# Patient Record
Sex: MALE | Race: WHITE | NOT HISPANIC OR LATINO | URBAN - METROPOLITAN AREA
[De-identification: names, ages, dates, MRNs, and addresses within clinical notes are randomized per-mention and may not be internally consistent; named-entity substitution may affect disease eponyms.]

---

## 2017-01-30 ENCOUNTER — GENERIC CONVERSION - ENCOUNTER (OUTPATIENT)
Dept: OTHER | Facility: OTHER | Age: 22
End: 2017-01-30

## 2017-01-31 ENCOUNTER — GENERIC CONVERSION - ENCOUNTER (OUTPATIENT)
Dept: OTHER | Facility: OTHER | Age: 22
End: 2017-01-31

## 2018-01-09 NOTE — MISCELLANEOUS
Message      Recorded as Task   Date: 04/09/2016 04:47 PM, Created By: Italia Mora   Task Name: Call Back   Assigned To: TRISTAN LEAL   Regarding Patient: Kurtis Brink, Status: Active   Comment:    Italia Mora - 09 Apr 2016 4:47 PM     TASK CREATED  Please make pt aware that I will no longer be filling his Adderall  His urine drug screens from 3/1 and 3/18 were negative for amphetamines  This is a concern since he has called and requested and  scripts for Adderall every month  Since his urine shows that he is not taking it I am not comfortable prescribing  It should be noted that pt is in recovery from heroin  He is presently getting Suboxone from [facility] in [facility]  This is where the urine drug screens were done  Records from [facility] will be scanned to the chart  He should not receive any controlled substances from this office  Nisha Almazan - 10 Apr 2016 7:56 PM     TASK EDITED  Patient is aware, was not happy  He said he does take it daily  He does not know why it doesn't show up in his urine  He did call back to tell me that he was sick for 2 weeks with vomiting and maybe that's why it didn't show  I told him that we will not fill his Adderall anymore  Active Problems    1  Acute bilateral low back pain without sciatica (724 2,338 19) (M54 5)   2  ADD (attention deficit disorder) without hyperactivity (314 00) (F90 0)   3  Anxiety (300 00) (F41 9)   4  Asthma (493 90) (J45 909)   5  Depression with anxiety (300 4) (F41 8)   6  Drug addiction in remission (304 93) (F19 21)   7  Hepatitis C virus carrier state (V02 62) (Z22 52)   8  Insomnia (780 52) (G47 00)   9  Need for 23-polyvalent pneumococcal polysaccharide vaccine (V03 82) (Z23)   10  Pharyngitis (462) (J02 9)    Current Meds   1  Amphetamine-Dextroamphetamine 20 MG Oral Tablet; Take 2 tablets in the am and 1   tablet at noon; Therapy: 54OLC4036 to (Evaluate:07Apr2016);  Last DB:92LFJ2122 Ordered   2  Buprenorphine HCl - 8 MG Sublingual Tablet Sublingual;   Therapy: (Recorded:22Mar2016) to Recorded   3  Cyclobenzaprine HCl - 7 5 MG Oral Tablet; TAKE 1 TABLET 3 TIMES DAILY AS NEEDED   FOR MUSCLE SPASM; Therapy: 56OIA3506 to (Evaluate:07Apr2016)  Requested for: 28Mar2016; Last   Rx:28Mar2016 Ordered   4  Gabapentin 100 MG Oral Capsule; TAKE 1 CAPSULE Every 8 hours; Therapy: 24QIG5464 to Recorded   5  Naproxen 500 MG Oral Tablet; 1 tab PO q 12 hrs prn pain; Therapy: 68FEF3484 to (Brice Weaver)  Requested for: 28Mar2016; Last   Rx:28Mar2016 Ordered   6  ProAir  (90 Base) MCG/ACT Inhalation Aerosol Solution; USE 2 INHALATIONS   ORALLY   EVERY 4 TO 6 HOURS AS        NEEDED; Therapy: 13YLA5266 to (Evaluate:08Oct2014)  Requested for: 11Apr2014; Last   Rx:11Apr2014 Ordered    Allergies    1  BuSpar   2  LaMICtal TABS   3  Strattera CAPS    Signatures   Electronically signed by : Alejandro Amor;  Apr 11 2016  7:32AM EST                       (Author)

## 2018-01-11 NOTE — MISCELLANEOUS
Provider Comments  Provider Comments:   Pt was a no show for ER f/u for pneumonia  I ordered f/u chest xray and sent task informing pt he should have this done in 2 weeks        Signatures   Electronically signed by : Janne Sacks; Jul 28 2016  3:00PM EST                       (Author)    Electronically signed by : Luca Banerjee DO; Jul 28 2016  3:02PM EST                       (Author)

## 2018-01-13 NOTE — MISCELLANEOUS
Message   Recorded as Task   Date: 07/28/2016 02:59 PM, Created By: Katina Morales   Task Name: Call Back   Assigned To: Katina Morales   Regarding Patient: Lelia Painter, Status: Active   CommentNaomi Wise - 28 Jul 2016 2:59 PM     TASK CREATED  Pt was no show for ER f/u today  I ordered a chest xray for him to be done in 2 weeks to f/u his pneumonia he was treated for  Please call pt and let him know he should at least have this done so we know if his pneumonia has resolved  Monika Salas - 28 Jul 2016 3:20 PM     TASK REPLIED TO: Previously Assigned To [address] [facility]  patient called - truck broke down, no phone to call    rescheduled for 8/1        Active Problems    1  Acute bilateral low back pain without sciatica (724 2,338 19) (M54 5)   2  ADD (attention deficit disorder) without hyperactivity (314 00) (F90 0)   3  Anxiety (300 00) (F41 9)   4  Asthma (493 90) (J45 909)   5  Community acquired pneumonia [phone]) (J18 9)   6  Depression with anxiety (300 4) (F41 8)   7  Drug addiction in remission (304 93) (F19 21)   8  Hepatitis C virus carrier state (V02 62) (Z22 52)   9  Insomnia (780 52) (G47 00)   10  Need for 23-polyvalent pneumococcal polysaccharide vaccine (V03 82) (Z23)   11  Pharyngitis (462) (J02 9)    Current Meds   1  Amphetamine-Dextroamphetamine 20 MG Oral Tablet (Adderall); Take 2 tablets in the   am and 1 tablet at noon; Therapy: 78BMX1868 to (Evaluate:07Apr2016); Last Rx:08Mar2016 Ordered   2  Buprenorphine HCl - 8 MG Sublingual Tablet Sublingual;   Therapy: (Recorded:22Mar2016) to Recorded   3  Cyclobenzaprine HCl - 7 5 MG Oral Tablet; TAKE 1 TABLET 3 TIMES DAILY AS NEEDED   FOR MUSCLE SPASM; Therapy: 19OUP7822 to (Evaluate:07Apr2016)  Requested for: 28Mar2016; Last   Rx:28Mar2016 Ordered   4  Gabapentin 100 MG Oral Capsule; TAKE 1 CAPSULE Every 8 hours; Therapy: 46RDO6673 to Recorded   5  Naproxen 500 MG Oral Tablet; 1 tab PO q 12 hrs prn pain;    Therapy: 60EZW9519 to (Aliyah Drop)  Requested for: 28Mar2016; Last   Rx:28Mar2016 Ordered   6  ProAir  (90 Base) MCG/ACT Inhalation Aerosol Solution; USE 2 INHALATIONS   ORALLY   EVERY 4 TO 6 HOURS AS        NEEDED; Therapy: 56CWJ0683 to (Evaluate:80Ikt4023)  Requested for: 94SWN7344; Last   Rx:29Jun2016 Ordered    Allergies    1  BuSpar   2   LaMICtal TABS   3  Strattera CAPS    Signatures   Electronically signed by : German Keith; Jul 28 2016  5:30PM EST                       (Author)

## 2018-01-14 NOTE — MISCELLANEOUS
Message   Recorded as Task   Date: 03/08/2016 12:01 PM, Created By: Shey Ruelas   Task Name: Call Back   Assigned To: Shey Ruelas   Regarding Patient: Lupillo Ramirez, Status: Active   CommentCris Nick - 08 Mar 2016 12:01 PM     TASK CREATED  Refills for 1 month only  Needs to reschedule missed appointment and be seen to receive any more refills  Tatianna Moncada - 08 Mar 2016 1:08 PM     TASK REPLIED TO: Previously Assigned To [address] [facility]  Lm to call back   Sharon Akins - 08 Mar 2016 2:13 PM     TASK REPLIED TO: Previously Assigned To [address] [facility]  Pt came in and secheduled appt on 3/22/16 at 3:00        Active Problems    1  ADD (attention deficit disorder) without hyperactivity (314 00) (F90 0)   2  Anxiety (300 00) (F41 9)   3  Asthma (493 90) (J45 909)   4  Depression with anxiety (300 4) (F41 8)   5  Drug addiction in remission (304 93) (F19 21)   6  Insomnia (780 52) (G47 00)   7  Pharyngitis (462) (J02 9)    Current Meds   1  Acetaminophen 500 MG Oral Tablet; Take 1-2 every 4-6 hours as needed; Therapy: 90EDU8020 to Recorded   2  Amphetamine-Dextroamphetamine 20 MG Oral Tablet (Adderall); Take 2 tablets in the   am and 1 tablet at noon; Therapy: 92JPN4770 to (Evaluate:34Wrm3613); Last Rx:08Mar2016 Ordered   3  Gabapentin 300 MG Oral Capsule; Take 2 tablets 4 times/day; Therapy: 62GGS5318 to Recorded   4  Ibuprofen 200 MG Oral Tablet; Take 1-3 every 4-6 hours as needed; Therapy: 15RCD9887 to (Evaluate:20Nov2013) Recorded   5  ProAir  (90 Base) MCG/ACT Inhalation Aerosol Solution; USE 2 INHALATIONS   ORALLY   EVERY 4 TO 6 HOURS AS        NEEDED; Therapy: 15WXR0329 to (Evaluate:27Tuj4579)  Requested for: 03Skx9513; Last   Rx:49Hdt2076 Ordered   6  TraZODone HCl - 100 MG Oral Tablet; TAKE 1 TABLET Bedtime PRN; Therapy: 15ZNT7170 to (Evaluate:23Mar2016)  Requested for: 84WAF4081; Last   Rx:08Mar2016 Ordered    Allergies    1   Strattera CAPS    Signatures   Electronically signed by : German Keith; Mar  8 2016  3:14PM EST                       (Author)